# Patient Record
Sex: FEMALE | Race: WHITE
[De-identification: names, ages, dates, MRNs, and addresses within clinical notes are randomized per-mention and may not be internally consistent; named-entity substitution may affect disease eponyms.]

---

## 2018-04-22 ENCOUNTER — HOSPITAL ENCOUNTER (EMERGENCY)
Dept: HOSPITAL 80 - FED | Age: 63
Discharge: HOME | End: 2018-04-22
Payer: MEDICAID

## 2018-04-22 VITALS — DIASTOLIC BLOOD PRESSURE: 60 MMHG | SYSTOLIC BLOOD PRESSURE: 96 MMHG

## 2018-04-22 DIAGNOSIS — B96.20: ICD-10-CM

## 2018-04-22 DIAGNOSIS — N39.0: Primary | ICD-10-CM

## 2018-04-22 LAB — PLATELET # BLD: 219 10^3/UL (ref 150–400)

## 2018-04-22 NOTE — EDPHY
H & P


Time Seen by Provider: 04/22/18 20:26


HPI/ROS: 





CHIEF COMPLAINT:  Abdominal pain, dysuria





HISTORY OF PRESENT ILLNESS:  62-year-old female presents to the emergency 

department with dysuria and abdominal pain over last few days.  The patient 

states that she has a history of frequent kidney stones.  She thinks that this 

feels different than kidney stones that she has had in the past.  She has not 

had a bladder infection over 20 years.  No back pain.  No flank pain.  She 

feels nauseous although no vomiting.  She had little appetite this evening.  

She describes dysuria but also pain at the urethra even just with sitting and 

walking.  She denies hematuria.  No reported trauma.  She felt like she was 

developing chills this evening.





REVIEW OF SYSTEMS:


Constitutional:  Subjective chills.


Eyes:  No double or blurry vision.


ENT:  No sore throat.


Respiratory:  No cough, no shortness of breath.


Cardiac:  No chest pain.


Gastrointestinal:  As above.  No vomiting or diarrhea.


Genitourinary:  No dysuria.


Musculoskeletal:  No neck or back pain.


Skin:  No rashes.


Neurological:  No headache.


Past Medical/Surgical History: 





Kidney stones, vegan diet


Social History: 








Smoking Status: Never smoked


Physical Exam: 





General Appearance:  Alert, no distress.  37.2.  Nontoxic appearing.


Eyes:  Pupils equal and round.  Extraocular motions are all intact.


ENT:  Mouth:  Mucous membranes moist.


Respiratory:  No wheezing, rhonchi, or rales, lungs are clear to auscultation.


Cardiovascular:  Regular rate and rhythm.


Gastrointestinal:  Abdomen is soft and nontender, no masses, no rebound or 

guarding, bowel sounds normal.  No CVA tenderness bilaterally.


Neurological:  Alert and oriented x 3, cranial nerves II through XII grossly 

intact


Skin:  Warm and dry, no rashes.


Musculoskeletal:  Nontender to palpate along the cervical, thoracic or lumbar 

spine.  Neck is supple.


Extremities:  Full range of motion and no peripheral edema.


Psychiatric:  Patient is oriented X 3, there is no agitation.


Constitutional: 


 Initial Vital Signs











Temperature (C)  37.2 C   04/22/18 20:06


 


Heart Rate  70   04/22/18 20:06


 


Respiratory Rate  16   04/22/18 20:06


 


Blood Pressure  122/63 H  04/22/18 20:06


 


O2 Sat (%)  98   04/22/18 20:06








 











O2 Delivery Mode               Room Air














Allergies/Adverse Reactions: 


 





fenugreek Allergy (Verified 04/22/18 20:10)


 


progesterone Allergy (Verified 04/22/18 20:10)


 








Home Medications: 














 Medication  Instructions  Recorded


 


Cephalexin [Keflex] 500 mg PO QID #28 cap 04/22/18


 


Phenazopyridine HCl [Pyridium] 200 mg PO Q8PRN PRN #6 tab 04/22/18














Medical Decision Making


ED Course/Re-evaluation: 





62-year-old female presents to the emergency department with dysuria and lower 

abdominal pain.  The patient has not had a urinary tract infection in several 

years.  She does have a history of kidney stones although she states that this 

feels much different.  She has no flank pain.  She felt nauseous although no 

vomiting.  She had subjective fevers.





Urinalysis revealed 25-50 white blood cells, 10-15 red blood cells and trace 

bacteria.  Urine cultures pending.





The patient was given Pyridium as well as doors of oral Keflex.  She was not 

vomiting.  She tolerated this well.  I did offer CT scan to evaluate for 

possible concurrent kidney stone although again the patient has no flank pain.  

She has had numerous kidney stones which she is able to pass on her own and she 

feels that this is different.





The patient was told to return to the emergency department she developed fever, 

vomiting, worsening abdominal or back pain, or if she felt worse in any way.


Differential Diagnosis: 





Including but not limited to urinary tract infection, pyelonephritis, kidney 

stone, acute appendicitis, sepsis





- Data Points


Laboratory Results: 


 Laboratory Results





 04/22/18 20:05 





 04/22/18 20:05 








Microbiology Results: 


 MICROBIOLOGY





04/22/18 20:30   Urine,Clean Catch   Urine Culture - Preliminary


                            Gram Neg Erik Lactose 





Medications Given: 


 








Discontinued Medications





Cephalexin HCl (Keflex)  500 mg PO EDNOW ONE


   PRN Reason: Protocol


   Stop: 04/22/18 21:33


   Last Admin: 04/22/18 21:46 Dose:  500 mg


Phenazopyridine HCl (Pyridium)  200 mg PO EDNOW ONE


   Stop: 04/22/18 21:32


   Last Admin: 04/22/18 21:46 Dose:  200 mg








Departure





- Departure


Disposition: Home, Routine, Self-Care


Clinical Impression: 


Urinary tract infection


Qualifiers:


 Urinary tract infection type: site unspecified Hematuria presence: without 

hematuria Qualified Code(s): N39.0 - Urinary tract infection, site not specified





Condition: Good


Instructions:  Cephalexin (By mouth), Phenazopyridine (By mouth), Urinary Tract 

Infection in Women (ED)


Additional Instructions: 


Keflex 500 mg 4 times daily for 1 week.  Call [phone] for the results of 

your urine culture in 48 hr.  Pyridium as needed for symptoms of burning and 

frequency with urination.





Ibuprofen 400 mg every 8 hr as needed for pain.





Return to the emergency department if you develop back pain, fever, vomiting, 

or if you feel worse in any way.


Referrals: 


JOAN GUTIERREZ [Other] - 1-2 days without fail


Prescriptions: 


Cephalexin [Keflex] 500 mg PO QID #28 cap


Phenazopyridine HCl [Pyridium] 200 mg PO Q8PRN PRN #6 tab


 PRN Reason: P.r.n. Dysuria